# Patient Record
Sex: MALE | Race: WHITE | NOT HISPANIC OR LATINO
[De-identification: names, ages, dates, MRNs, and addresses within clinical notes are randomized per-mention and may not be internally consistent; named-entity substitution may affect disease eponyms.]

---

## 2016-12-28 NOTE — ED ADULT NURSE NOTE - ED STAT RN HANDOFF DETAILS
report given to elizabeth reyes report given to elizabeth reyes    report given by Roberto ALEX RN @[id number]

## 2016-12-28 NOTE — ED ADULT NURSE NOTE - OBJECTIVE STATEMENT
Pt presents to the ER alert, following commands, nonverbal (expressive aphasia from prior stroke), and bed bound from rehab (admitted to rehab for treatment status post dx of multi focal pneumonia). As per personal aide, pt had skin tear to right lower extremity, which has been getting progressively worse, and being treated with topical bacitracin. Right lower extremity red with cyanotic foot and toes, and cold to touch; pedal pulse is not palpable to palpation; right side weak at baseline from previous stroke. MD Ellsworth at bedside with doppler. Health aide denies recent trauma to site or recent falls.

## 2016-12-28 NOTE — ED ADULT NURSE REASSESSMENT NOTE - NS ED NURSE REASSESS COMMENT FT1
MD De La Torre okayed heparin drip without PTT results. Milady TINEO at bedside co-signing heparin push and drip. pt VSS. pt AAOX1 at baseline.

## 2016-12-28 NOTE — ED PROVIDER NOTE - CRITICAL CARE PROVIDED
documentation/consult w/ pt's family directly relating to pts condition/interpretation of diagnostic studies/consultation with other physicians/direct patient care (not related to procedure)/additional history taking

## 2016-12-28 NOTE — ED PROVIDER NOTE - ATTENDING CONTRIBUTION TO CARE
89 yo male with PMH of CVA with resideual expressive aphasia dn RUE and RLE weakness and contractures presents form rehab ( sent from [facility] after recent admision for dehydration/weakness)  facility with RLE ischemic limb. Patient complaining of pain and difficulty bearing weight in rle (has chronic RLE ulcers/wounds) for the past 2 weeks. On exam, RLE is cool, diffusely dusky, ttp, and no palpable or dopplerable pulses. cta bl,s 1, s,2 , soft nt nd adbomen Patient is alert and awake,in nad. FS normal. Vascular called immediately. IV access initially challenging and required US guidance. Heparin started. Family did not want surgery and wanted conservative medical management. Vascular will call SICU c/s.

## 2016-12-28 NOTE — ED ADULT NURSE REASSESSMENT NOTE - NS ED NURSE REASSESS COMMENT FT1
SICU resident paged twice for transport of pt to SICU. SICU on their way. pt tele packed and ready for transport.

## 2016-12-28 NOTE — ED ADULT NURSE NOTE - PMH
CVA (cerebral vascular accident)  2000 with right weakness and expressive aphasia  Expressive aphasia    Hypothyroid    Psoriasis and similar disorder

## 2016-12-29 NOTE — H&P ADULT. - EXTREMITIES COMMENTS
RLE with coolness past distal lower leg, cyanosis, poor capillary refill, tense posterior lower leg compartments, open skin wounds on dorsum of foot and anterior/lateral lower leg, no pulses/signals of right foot, doppler signal of right popliteal artery, palpable R femoral pulse. LLE warm, no wounds, palpable PT, doppler signal of DP.

## 2016-12-29 NOTE — H&P ADULT. - HISTORY OF PRESENT ILLNESS
89 M, 89 M who presents with worsening progressive ischemia of the right foot over the past 2-3 weeks.  The Patient was recently admitted to [facility] about 3 weeks ago for weakness, decreased appetite, with concern for dehydration.  He had previously been more functional and independent, able to ambulate with a cane and to take care of ADL's by himself.  Patient was treated for likely aspiration PNA and eventually discharged to rehab facility on 12/19.  Per patient's family, there was a chronic wound of the RLE but foot was not cool and there was no discoloration of the right foot during the last hospitalization.  Patient had outpatient LE venous duplex just prior to the admission that was negative for DVT.  The patient still has not yet gained functionality to pre hospitalization status.  The patient has expressed RLE pain intermittently over the past 2 weeks, but has been able to ambulate on the foot with assistance during therapy.      Patient's past medical history is significant for a CVA about 18 years ago, with residual expressive aphasia and RUE and RLE weakness.  Patient has also had a diffuse intermittent rash seen by outpatient dermatology, and had been on PO steroids on two separate occasions.

## 2016-12-29 NOTE — H&P ADULT. - ASSESSMENT
89M with acute on chronic RLE arterial insufficiency.  - Admit to Vascular Surgery, Dr. Kennedy  - SICU Consult  - Therapeutic Anticoagulation on Heparin gtt.  - Would like CTA with runoff of LEs, but in setting of ELSY will hold off at this time.    - Patient's family informed about possible surgical interventions and associated risks, benefits, and alternatives.  Informed patient of the high surgical risk of vascular surgical intervention.  Possible necessary operations include angiogram, thrombectomy, and/or amputation of RLE.  Patient's family elected for avoidance of aggressive surgical measures, and would like to attempt medical management including IV hydration and therapeutic anticoagulation on heparin gtt.    - Per SICU team, further conversations had regarding goals of care, and patient made DNR/DNI by family.  Family would still like to pursue medical treatments.

## 2017-01-03 NOTE — PROVIDER CONTACT NOTE (OTHER) - NAME OF MD/NP/PA/DO NOTIFIED:
Dr Foreman
Maurizio DE LA FUENTE, Alba CROCKETT, Pepper DE LA FUENTE
Maurizio De La Fuente, Pepper DE LA FUENTE, Alba CROCKETT
PCU team
Pepper DE LA FUENTE
Pepper DE LA FUENTE
Maurizio DE LA FUENTE, Pepper DE LA FUENTE, Alba CROCKETT

## 2017-01-03 NOTE — PROVIDER CONTACT NOTE (OTHER) - BACKGROUND
Patient has DNR order
RLE ischemia
RLE ischemia with plan for PCU transfer
RLE ischemia; Pt to be transferred to palliative care
2 doses of dilaudid given since 1900

## 2017-01-03 NOTE — PROVIDER CONTACT NOTE (OTHER) - ACTION/TREATMENT ORDERED:
Dilaudid drip at 1mg hour, PRN of 2mg
Continue to manage pain with current orders; SICU team to reattempt IV placement with US; No other interventions ordered at this time
Fentanyl patch and morphine subq ordered for pain control; Haldol IM ordered for agitation; SICU team will reattempt IV access with US as soon as possible; No other invention ordered at this time
No intervention ordered at this time; Maurizio DE LA FUENTE Attending aware; SICU team instructs 'only comfort measures' to be taken
No intervention ordered at this time; SICU team did not reattempt to place PIV with US for shift; Maurizio DE LA FUENTE Attending aware; Instructed to perform 'comfort care only'
Pepper DE LA FUENTE to use US at bedside for IV placement as soon as possible
See patient expiration note

## 2017-01-03 NOTE — PROVIDER CONTACT NOTE (OTHER) - DATE AND TIME:
03-Jan-2017 01:05
03-Jan-2017 23:03
29-Dec-2016 21:00
29-Dec-2016 22:30
29-Dec-2016 23:15
30-Dec-2016 00:00
30-Dec-2016 05:00

## 2017-01-03 NOTE — PROVIDER CONTACT NOTE (OTHER) - RECOMMENDATIONS
Spouse and daughter at bedside, agreeable to dilaudid drip for comfort care (relief of dyspnea)
IV RN paged; SICU team come to bedside US-guided for PIV placement
Patient pronounced dead at 2240, family made aware, daughter refused autopsy at this time.
Perform EKG; Reattempt IV access; Control pain / agitation
Reattempt IV placement with US; Place NGT for hydration
SICU team must reattempt to place PIV via US for IV hydration administration
SICU team place PIV with US for IV hydration

## 2017-01-03 NOTE — DISCHARGE NOTE FOR THE EXPIRED PATIENT - NS PATIENT DEATH CRITERIA
Patient is dead based on Cardiopulmonary criteria including absent breath sounds, pulselessness and/or asystole

## 2017-01-03 NOTE — PROVIDER CONTACT NOTE (OTHER) - ASSESSMENT
Pt is with dyspnea, RR 30-34 min, use of accessory muscles
No response to external stimuli  No spontaneous respirations  No apical heart rate  Negative papillary response to light
Pt continues to have no IV access since 2100. Multiple attempts made by nursing staff. IV RN requested and stated she would not have time to come. Alba CROCKETT attempted to place PIV with US and was not successful. SICU team asked multiple times to reattempt throughout shift, so that pt may receive IV hydration. Pt currently NPO, no UO, SBP < 90.
Pt intermittently tachycardic - HR ranging from 100-135bpm - irregular rate. Pt restless/mildly agitated/combative at times; Pt grimacing and guarding RLE at times; No IV access at this time; All other VSS
Pt received with 1 L upper arm PIV; IV site red, swollen, tender, and leaking - PIV removed; Nursing staff unable to obtain new IV access; Pt ordered to receive IVF and IV pain meds PRN; IV RN paged
SICU made aware again that the pt has no IV access; Nursing staff unable to obtain access; IV RN paged; Alba CROCKETT attempted with US, but was unsuccessful; Pain controlled with morphine subq and fentanyl patch, as per order; Pt NPO - previously pulled out NGT; Unable to receive ordered IV fluids
SICU team, including Maurizio Pabon MD and Alba made aware several times throughout shift of pt's deceasing UO and current UO of 0cc for last hour; Pt unable to receive bolus or maintenance IVF d/t lack of IV access; Pt NPO and requires swallow eval; Current BP 86/54 MAP 65; Pt asleep at this time

## 2017-01-03 NOTE — DISCHARGE NOTE FOR THE EXPIRED PATIENT - HOSPITAL COURSE
Pt brought to PCU for comfort care. Pain and dyspnea well-controlled with supplemental oxygen and IV opioid analgesia.  Daily updates provided to pt's daughter and wife at bedside. Pt passed away at 1:03am on 1/4/2017.  Family notified.

## 2017-01-06 ENCOUNTER — APPOINTMENT (OUTPATIENT)
Dept: DERMATOLOGY | Facility: CLINIC | Age: 82
End: 2017-01-06

## 2017-01-13 ENCOUNTER — APPOINTMENT (OUTPATIENT)
Dept: GERIATRICS | Facility: CLINIC | Age: 82
End: 2017-01-13

## 2019-05-20 NOTE — PROVIDER CONTACT NOTE (OTHER) - SITUATION
RN left message for the patient to call [facility] at [phone] at her earliest convenience to schedule her ultrasound and consult.     
Pt continues to have no IV access
Low UO; UO for last hour 0cc
No IV access
No IV access
Patient without spontaneous respirations or pulse
Pt intermittently tachycardic - HR ranging from 100-135bpm
4th prn dose of dilaudid prn for dyspnea in 24 hours